# Patient Record
Sex: FEMALE | Race: BLACK OR AFRICAN AMERICAN | Employment: FULL TIME | URBAN - METROPOLITAN AREA
[De-identification: names, ages, dates, MRNs, and addresses within clinical notes are randomized per-mention and may not be internally consistent; named-entity substitution may affect disease eponyms.]

---

## 2017-02-14 ENCOUNTER — APPOINTMENT (OUTPATIENT)
Dept: PHYSICAL THERAPY | Age: 47
End: 2017-02-14

## 2017-02-15 ENCOUNTER — HOSPITAL ENCOUNTER (OUTPATIENT)
Dept: PHYSICAL THERAPY | Age: 47
Discharge: HOME | End: 2017-02-15
Payer: COMMERCIAL

## 2017-02-15 PROCEDURE — 97162 PT EVAL MOD COMPLEX 30 MIN: CPT

## 2017-02-15 PROCEDURE — 97530 THERAPEUTIC ACTIVITIES: CPT

## 2017-02-15 NOTE — PROGRESS NOTES
PHYSICAL THERAPY - DAILY TREATMENT NOTE    Patient Name: Dillan Allison        Date: 2/15/2017  : 1970   YES Patient  Verified  Visit #:   1     Insurance: Payor: BLUE CROSS / Plan: [id number] / Product Type: PPO /      In time: 9:00 Out time: 9:50   Total Treatment Time: 50     Medicare Time Tracking (below)   Total Timed Codes (min):  na 1:1 Treatment Time:  na     TREATMENT AREA =  B knee OA    SUBJECTIVE    Pain Level (on 0 to 10 scale):  refused to rate  / 10   Medication Changes/New allergies or changes in medical history, any new surgeries or procedures? YES    If yes, update Summary List   Subjective Functional Status/Changes:  []  No changes reported     History of Condition: Patient reports B knee pain began ~3 months ago, reports R is worse than left and feels popping. Patient reports pain got worse over time. Patient reports she went to ER a few times for pain, and saw PCP who referred her to Dr. Sergey Gardiner and Dr. Suzy Madrigal. Patient reports she underwent x-rays, which determined arthritis in knees. Patient reports rheumatologist told her she has arthritis everywhere and was told she should quit her job. Patient reports she is hoping to be recommended for weight loss surgery. Patient reports she has never used AD.     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    Pt Goals: \"make it better so I don't have to change my job\"    FOTO: 33         OBJECTIVE  Physical Therapy Evaluation - Knee    Gait:  [] Normal    [x] Abnormal    [x] Antalgic    [] NWB    Device: none    Describe: very slow stu, slumped posture, antalgic on R    ROM / Strength  [] Unable to assess                  AROM                      PROM Strength (1-5)    Left Right Left Right Left Right   Hip Flexion     4+ 4    Extension          Abduction seated     4 4    Adduction  seated     3 3   Knee Flexion 106 85  PD 4+ 4    Extension -6 -5   4+ 4-   Ankle Plantarflexion          Dorsiflexion     4 4   Pain with knee ext MMT L; all on R     Flexibility: [x] Unable to assess at this time  Hamstrings:    (L) Tightness= [] WNL   [] Min   [] Mod   [] Severe    (R) Tightness= [] WNL   [] Min   [] Mod   [] Severe  Quadriceps:    (L) Tightness= [] WNL   [] Min   [] Mod   [] Severe    (R) Tightness= [] WNL   [] Min   [] Mod   [] Severe  Gastroc:      (L) Tightness= [] WNL   [] Min   [] Mod   [] Severe    (R) Tightness= [] WNL   [] Min   [] Mod   [] Severe  Other:    Palpation:   Neg/Pos  Neg/Pos  Neg/Pos   Joint Line L lat  R lat and med Quad tendon  Patellar ligament    Patella  Fibular head  Pes Anserinus    Tibial tubercle  Hamstring tendons  Infrapatellar fat pad      Optional Tests:   Patellar Positioning (Static)   []L []R Normal []L []R Lateral   []L []R Estephanie Mcardle      []L []R Medial   []L []R [place]    Patellar Tracking   []L []R Glide (Lat)   []L []R Tilt (Lat)     []L []R Glide (Med)  []L []R Tilt (Med)      []L []R Tile (Inf)     Patellar Mobility   []L []R Hypermobile [x]L [x]R Hypomobile         Girth Measurements in cm:      4 in above midpatella   2 in above midpatella  at  midpatella  2 in below midpatella   4 in below midpatella   Left        Right           Lachmans  [] Neg    [] Pos Posterior Drawer [] Neg    [] Pos  Pivot Shift  [] Neg    [] Pos Posterior Sag  [] Neg    [] Pos  PORTILLO   [] Neg    [] Pos Dre's Test [] Neg    [] Pos  ALRI   [] Neg    [] Pos Squat   [] Neg    [] Pos  Valgus@ 0 Degrees [] Neg    [] Pos Sumit  [] Neg    [] Pos  Valgus@ 30 Degrees [] Neg    [] Pos Patellar Apprehension [] Neg    [] Pos  Varus@ 0 Degrees [] Neg    [] Pos Magdaleno's Compression [] Neg    [] Pos  Varus@ 30 Degrees [] Neg    [] Pos Ely's Test  [] Neg    [] Pos  Apley's Compression [] Neg    [] Pos Bridget's Test  [] Neg    [] Pos  Apley's Distraction [] Neg    [] Pos Stroke Test  [] Neg    [] Pos   Anterior Drawer [] Neg    [] Pos Fluctuation Test [] Neg    [] Pos  Other:                  [] Neg    [] Pos                 25 min Therapeutic Activity: FOTO, pool paperwork review   Rationale:   Determine functional limitations, ensure safety with aquatherapy   min Patient Education:  YES  Reviewed HEP   []  Progressed/Changed HEP based on:   HEP issued      Other Objective/Functional Measures:    See above     Post Treatment Pain Level (on 0 to 10) scale:   NR  / 10     ASSESSMENT    Assessment/Changes in Function:     Justification for Eval Code Complexity:  Patient History (low 0, mod 1-2, high 3-4): heart disease, arthritis, HTN, asthma, Hx multiple surgeries in R groin, depression, back pain, obese, GI disease, incontinence  Examination (low 1-2, mod 3+, high 4+):   Clinical Presentation (low stable or uncomplicated, mod evolving or changing, high unstable or unpredictable):   Clinical Decision Making (low , mod 26-74, high 1-25): FOTO     See PoC     []  See Progress Note/Recertification   Patient will continue to benefit from skilled PT services to modify and progress therapeutic interventions, address functional mobility deficits, address ROM deficits, address strength deficits, analyze and address soft tissue restrictions, analyze and cue movement patterns, analyze and modify body mechanics/ergonomics and assess and modify postural abnormalities to attain remaining goals.    Progress toward goals / Updated goals:    Goals established, See PoC     PLAN    [x]  Upgrade activities as tolerated YES Continue plan of care   []  Discharge due to :    [x]  Other: Initiate PoC     Therapist: Tahmina Mock PT    Date: 2/15/2017 Time: 9:15 AM

## 2017-02-15 NOTE — PROGRESS NOTES
[facility] PHYSICAL THERAPY  [address] [place] [address] [address], [address] - Phone: [phone]  Fax: [phone] / [facility]  Patient Name: Frankey Stall : 1970   Medical   Diagnosis: Right knee pain [M25.561] Treatment Diagnosis: R>L knee OA   Onset Date: 3 months ago     Referral Source: MD Dr. Riya Peters [facility]): 2/15/2017   Prior Hospitalization: See medical history Provider #: [id number]   Prior Level of Function: Sedentary; works as HRT    Comorbidities: heart disease, arthritis, HTN, asthma, Hx multiple surgeries in R groin, depression, back pain, obese, GI disease, incontinence   Medications: Verified on Patient Summary List   The Plan of Care and following information is based on the information from the initial evaluation.   ===========================================================================================  Assessment / key information:  Frankey Stall is a 48 y.o.  female with Dx: Right knee pain [M25.561], signs and symptoms consistent with B knee OA. Patient reports c/o pain and popping in R>L knees beginning a few months ago. Patient reports she has difficulty performing job duties (driving) due to severity of knee pain. Patient denies use of AD. Patient also perseverates on desire to receive weight loss surgery so she can have knee replacements. Objective: Patient demonstrates impaired gait and posture, significantly decreased R knee AROM and strength, refused PROM due to pain. Patient reports pain with knee ext MMT, all MMT on RLE. Patient also TTP over L lateral joint line, and R medial and lateral joint lines, as well as hypomobility of B patellae. Patient refuses land based therapy, would like to participate in aquatherapy at this time. Patient also refused to report pain levels pre or post session.       AROM   PROM   Strength (1-5)      Left Right Left Right Left Right   Hip Flexion         4+ 4     Abduction seated         4 4     Adduction  seated         3 3   Knee Flexion 106 85   PD 4+ 4     Extension -6 -5     4+ 4-   Ankle Plantarflexion                 Dorsiflexion         4 4     FOTO score 33 points indicating 67% limitation in functional ability. Patient would benefit from skilled PT to address the below listed impairments. Thank you for your referral.  ===========================================================================================  Eval Complexity: History: HIGH Complexity :3+ comorbidities / personal factors will impact the outcome/ POC Exam:HIGH Complexity : 4+ Standardized tests and measures addressing body structure, function, activity limitation and / or participation in recreation  Presentation: MEDIUM Complexity : Evolving with changing characteristics  Clinical Decision Making:MEDIUM Complexity : FOTO score of 26-74Overall Complexity:MEDIUM  Problem List: pain affecting function, decrease ROM, decrease strength, edema affecting function, impaired gait/ balance, decrease ADL/ functional abilitiies, decrease activity tolerance, decrease flexibility/ joint mobility and decrease transfer abilities   Treatment Plan may include any combination of the following: Therapeutic exercise, Therapeutic activities, Neuromuscular re-education, Physical agent/modality, Gait/balance training, Manual therapy, Aquatic therapy, Patient education, Self Care training, Functional mobility training, Home safety training and Stair training  Patient / Family readiness to learn indicated by: asking questions, trying to perform skills and interest  Persons(s) to be included in education: patient (P)  Barriers to Learning/Limitations: None  Measures taken: na   Patient Goal (s): \"make it better so I don't have to change my job\"   Patient self reported health status: fair  Rehabilitation Potential: fair   Short Term Goals:  To be accomplished in 2-3  weeks:  1. Patient will demonstrate compliance with HEP for symptom management at home. 2. Patient will improve R knee flexion AROM to >90 degrees to improve safety with amb. 3. Patient will report pain at worst 7/10 to improve ADL tolerance.  Long Term Goals: To be accomplished in  4-6  weeks:  1. Patient will be independent with HEP to self-manage and prevent symptoms upon DC. 2.  Patient will improve FOTO score to 57 points to indicate improved functional status. 3.  Patient will improve R knee flex and ext strength to 4+/5 to improve stability with amb. 4.  Patient will report ability to drive >1 hour to return to full work duties. Frequency / Duration:   Patient to be seen  2  times per week for 4-6  weeks:  Patient / Caregiver education and instruction: self care and activity modification  G-Codes (GP): lauri  Therapist Signature: Dalila Estrada PT Date: 8/01/3443   Certification Period: na Time: 9:15 AM   ===========================================================================================  I certify that the above Physical Therapy Services are being furnished while the patient is under my care. I agree with the treatment plan and certify that this therapy is necessary. Physician Signature:        Date:       Time:     Please sign and return to [employer] or you may fax the signed copy to [phone]. Thank you.

## 2017-02-23 ENCOUNTER — HOSPITAL ENCOUNTER (OUTPATIENT)
Dept: PHYSICAL THERAPY | Age: 47
Discharge: HOME | End: 2017-02-23
Payer: COMMERCIAL

## 2017-02-23 ENCOUNTER — APPOINTMENT (OUTPATIENT)
Dept: PHYSICAL THERAPY | Age: 47
End: 2017-02-23
Payer: COMMERCIAL

## 2017-02-23 PROCEDURE — 97530 THERAPEUTIC ACTIVITIES: CPT

## 2017-02-23 PROCEDURE — 97162 PT EVAL MOD COMPLEX 30 MIN: CPT

## 2017-02-23 NOTE — PROGRESS NOTES
PHYSICAL THERAPY - DAILY TREATMENT NOTE    Patient Name: Faraz Vital        Date: 2017  : 1970   YES Patient  Verified  Visit #:   2   of     Insurance: Payor: BLUE CROSS / Plan: [id number] / Product Type: PPO /      In time: 11:00 Out time: 11:40   Total Treatment Time: 40     Medicare Time Tracking (below)   Total Timed Codes (min):  na 1:1 Treatment Time:  na     TREATMENT AREA =  B knee pain, LBP  SUBJECTIVE    Pain Level (on 0 to 10 scale):  5  / 10   Medication Changes/New allergies or changes in medical history, any new surgeries or procedures? 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 Patient reports she was off work 2 weeks and has had decreased pain since and has not had to take medication. Patient has also recently been put on antidepressant. Patient denies radicular symptoms, reports interm a little bit of numbness if sitting a long time.     Aggravating Factors: driving sometimes, lifting, twisting back, twinges with certain motions, walking if she is already in pain, stooping    Alleviating Factors: heating pad, lying on back sometimes, fetal position    Previous Treatment: none    PMHx:see chart    Social/Recreational/Work: see previous eval    Pt Goals: \"get where I can lift things that I need to lift and not have to wait for someone to do it for me\"     FOTO: 37     LOW BACK EVALUATION    Objective:      Gait: decreased step length, decreased trunk rotation    Posture:slumped posture, forward head, rounded shoulders    Palpation/Sensation:    (N - normal; R - reduced; MR - markedly reduced)       L/S ROM      Range   Effect  Strength (MMT)          Right        Left    Flex 50%  Psoas (L2,3) 4 4+   Ext 75%  Quads (L3) 4 4+   R-lat flex 75%  Ant tib(L4) 4+ 4+   L-lat flex 75% pain Hip Ext (S1, S2)     R-rot 75%  Glut Med(L5) 4 4-   L-rot 75%  Hamstrings(S1,S2) 4+ 4+      Hip IR/ER       Strength (MMT)       Right     Left          Core: Sup Bridge fair fair   Core: Side Bridge     Core: Prone plank       Special Tests                       Right     Left          Flexibility         Right              Left    Slump   90/90 HS -23 -18   SLR   Ely     Crossed SLR  +  Guilherme     Sl screen 3/5=70% LR   Bridget     Gaenslen test   Hip IR (prone)     Neemsio/IJEOMA sign   Hip ER (prone)     Thigh thrust        Distraction        Lateral Compression        Sacral Provocation          Effect of:  DKC    Supine Bridge    SL Supine Bridge    Prone on Elbows    RFIS    REIL          SI Symmetry:    Standing        Supine            Prone                Dynamic      +/- R/L  ASIS    Fwd Flex    IC    Stork    PSIS    Seated FF      Sup to Sit:     10 min Therapeutic Activity: FOTO   Rationale:   Determine functional limitations     min Patient Education:  []  Review HEP   []  Progressed/Changed HEP based on:   HEP initiated      Other Objective/Functional Measures:    See above information     Post Treatment Pain Level (on 0 to 10) scale:   3  / 10     ASSESSMENT    Assessment/Changes in Function:     Justification for Eval Code Complexity:  Patient History (low 0, mod 1-2, high 3-4): heart disease, arthritis, HTN, asthma, Hx multiple surgeries in R groin, depression, back pain, obese, GI disease, incontinence  Examination (low 1-2, mod 3+, high 4+):   Clinical Presentation (low stable or uncomplicated, mod evolving or changing, high unstable or unpredictable):   Clinical Decision Making (low , mod 26-74, high 1-25): FOTO     See PoC     []  See Progress Note/Recertification   Patient will continue to benefit from skilled PT services to modify and progress therapeutic interventions, address functional mobility deficits, address ROM deficits, address strength deficits, analyze and address soft tissue restrictions, analyze and cue movement patterns, analyze and modify body mechanics/ergonomics and assess and modify postural abnormalities to attain remaining goals.    Progress toward goals / Updated goals:    Goals established, see PoC     PLAN    [x]  Upgrade activities as tolerated YES Continue plan of care   []  Discharge due to :    [x]  Other: Initiate PoC     Therapist: Lynn Richey PT    Date: 2/23/2017 Time: 11:01 AM

## 2017-02-23 NOTE — PROGRESS NOTES
[facility] PHYSICAL THERAPY  [address] [place] [address] [address], [address] - Phone: [phone]  Fax: [phone]  [place]          Patient Name: Esau Bansal : 1970   Treatment/Medical Diagnosis: Low back pain [M54.5]   Onset Date: ~3 months ago    Referral Source: MD Dr. Brett Najera [facility]): 2/15/17 for knees  17 for back   Prior Hospitalization: See Medical History Provider #: [id number]   Prior Level of Function: Sedentary; works as HRT    Comorbidities: heart disease, arthritis, HTN, asthma, Hx multiple surgeries in R groin, depression, back pain, obese, GI disease, incontinence   Medications: Verified on Patient Summary List   Vis[facility] from [facility]: 2 Missed Visits: 0     Please see PoC dated 2/15/17. 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 Patient initially denies radicular symptoms, then reports she interm has LE numbness if sitting for a long time. Patient demonstrates impaired gait and posture, decreased lumbar ROM and LE strength. Patient with fair supine bridge, + crossed SLR on R (pain on left)m and + 90/90 HS test on R. L/S ROM Range Effect Strength (MMT) Right Left   Flex 50%   Psoas (L2,3) 4 4+   Ext 75%   Quads (L3) 4 4+   R-lat flex 75%   Ant tib(L4) 4+ 4+   L-lat flex 75% pain Hip Ext (S1, S2)       R-rot 75%   Glut Med(L5) 4 4-   L-rot 75%   Hamstrings(S1,S2) 4+ 4+     FOTO score for L/S 37 indicating 63% limitation in functional ability.     Problem List: pain affecting function, decrease ROM, decrease strength, edema affecting function, impaired gait/ balance, decrease ADL/ functional abilitiies, decrease activity tolerance, decrease flexibility/ joint mobility and decrease transfer abilities   Treatment Plan may include any combination of the following: Therapeutic exercise, Therapeutic activities, Neuromuscular re-education, Physical agent/modality, Gait/balance training, Manual therapy, Aquatic therapy, Patient education, Self Care training, Functional mobility training, Home safety training and Stair training  Patient Goal(s) has been updated and includes:  \"get where I can lift things that I need to lift and not have to wait for someone to do it for me\"  \"make it better so I don't have to change my job\"   · Short Term Goals: To be accomplished in 2-3 weeks:  1. Patient will demonstrate compliance with HEP for symptom management at home. 2. Patient will improve R knee flexion AROM to >90 degrees to improve safety with amb. 3. Patient will report pain at worst 7/10 to improve ADL tolerance. · Long Term Goals: To be accomplished in 4-6 weeks:  1. Patient will be independent with HEP to self-manage and prevent symptoms upon DC. 2. Patient will improve knee FOTO score to 57 points to indicate improved functional status. 3. Patient will improve R knee flex and ext strength to 4+/5 to improve stability with amb. 4. Patient will report ability to drive >1 hour to return to full work duties. L/S goals to be accomplished in 4-6 weeks  1. Patient will improve L/S FOTO score to 58 points to indicate improved functional status. 2. Patient will demonstrate - 90/90 HS test bilaterally to decrease stress on spine. 3. Patient will improve B hip abd strength to 4/5 to improve dynamic pelvic stability. Frequency / Duration:   Patient to be seen   2   times per week for   4    weeks:  G-Codes (GP): na  Assessments/Recommendations: Patient to continue initial PoC  If you have any questions/comments please contact us directly at [phone]. Thank you for allowing us to assist in the care of your patient.     Therapist Signature: Lynn Richey, PT Date: 1/69/5261   Certification Period:  Reporting Period: na  na Time: 11:01 AM   NOTE TO PHYSICIAN:  PLEASE COMPLETE THE ORDERS BELOW AND FAX TO   [facility] Physical Therapy: [phone]. If you are unable to process this request in 24 hours please contact our office: [phone].    ___ I have read the above report and request that my patient continue as recommended.   ___ I have read the above report and request that my patient continue therapy with the following changes/special instructions: ________________________________________________   ___ I have read the above report and request that my patient be discharged from therapy.      Physician Signature:        Date:       Time:

## 2017-02-24 ENCOUNTER — HOSPITAL ENCOUNTER (OUTPATIENT)
Dept: PHYSICAL THERAPY | Age: 47
Discharge: HOME | End: 2017-02-24
Payer: COMMERCIAL

## 2017-02-24 PROCEDURE — 97113 AQUATIC THERAPY/EXERCISES: CPT

## 2017-02-24 NOTE — PROGRESS NOTES
PHYSICAL THERAPY - DAILY TREATMENT NOTE - AQUATICS    Patient Name: Tin Grant        Date: 2017  : 1970   YES Patient  Verified  Visit #:   3     Insurance: Payor: Wu Almeida / Plan: [id number] [employer] / Product Type: PPO /      In time: 11:00 Out time: 11:46   Total Treatment Time: 46     Medicare Time Tracking (below)   Total Timed Codes (min):  na 1:1 Treatment Time:  na     TREATMENT AREA =  B knee pain, LBP    SUBJECTIVE    Pain Level (on 0 to 10 scale):  0  / 10   Medication Changes/New allergies or changes in medical history, any new surgeries or procedures? NO    If yes, update Summary List   Subjective Functional Status/Changes:  []  No changes reported     Pt reports no pain right now because she took pain medication earlier. OBJECTIVE  46  (23) min Therapeutic Exercise:   [x]  Aquatic Therapy   Rationale:      increase strength, increase ROM, and improve balance to improve the patients ability to perform ADL's and ambulate with less pain and increase activity tolerance.       [x]   Patient Education:  Continue Aquatic Therapy and HEP as instructed     UE exercise resistance:                                                  LE exercises:                                                                 [] none                                                                             []  thera-band resistance       [x] small water weights                                                   [] no UE support       [] medium water weights                                              [x]  1 UE support        [] large water weights                                                   [x]  2 UE support          [] back supported on wall       [] thera-band      SLS UE support:                      [] both UE                          [] 1 UE       [] 1 finger/fingers       []  none       [] EC     Post Treatment Pain Level (on 0 to 10) scale:   2  / 10     Other Introduction to aquatic therapy program.   Pt completed 10-20 reps of each exercise depending on difficulty/tolerance. Pt reports ms fatigue and soreness at end of session. ASSESSMENT    Assessment/Changes in Function:     Pt with good tolerance to initiation of aquatic ex. []  See Progress Note/Recertification   Patient will continue to benefit from skilled PT services to modify and progress therapeutic interventions, address functional mobility deficits, address ROM deficits, address strength deficits, analyze and address soft tissue restrictions, analyze and cue movement patterns, analyze and modify body mechanics/ergonomics and assess and modify postural abnormalities to attain remaining goals. Progress toward goals / Updated goals: · Short Term Goals: To be accomplished in 2-3 weeks:  1. Patient will demonstrate compliance with HEP for symptom management at home. 2. Patient will improve R knee flexion AROM to >90 degrees to improve safety with amb. 3. Patient will report pain at worst 7/10 to improve ADL tolerance. Initiated aquatics 2/24/17 for pain managment  · Long Term Goals: To be accomplished in 4-6 weeks:  1. Patient will be independent with HEP to self-manage and prevent symptoms upon DC. 2. Patient will improve knee FOTO score to 57 points to indicate improved functional status. 3. Patient will improve R knee flex and ext strength to 4+/5 to improve stability with amb.   4. Patient will report ability to drive >1 hour to return to full work duties.        PLAN    []  Upgrade activities as tolerated YES Continue plan of care   []  Discharge due to :    []  Other:      Therapist: Ruben Askew PTA    Date: 2/24/2017 Time: 3:12 PM     Future Appointments  Date Time Provider Mindi Castellanos   3/2/2017 1:00 PM Tylor Cornelius PT [facility]   [facility]   3/9/2017 1:00 PM Tylor Cornelius PT [facility]   3/10/2017 11:00 AM [facility]   3/16/2017 1:00 PM Neo Hopson PT [facility]   3/17/2017 11:00 AM [address] [place] [address]   3/23/2017 1:00 PM Neo Hopson PT [facility]   3/24/2017 11:00 AM [address] [place] [address]   3/30/2017 1:00 PM Neo Hopson PT [facility]   3/31/2017 11:00 AM [address] [place] [address]

## 2017-03-02 ENCOUNTER — HOSPITAL ENCOUNTER (OUTPATIENT)
Dept: PHYSICAL THERAPY | Age: 47
Discharge: HOME | End: 2017-03-02
Payer: COMMERCIAL

## 2017-03-02 PROCEDURE — 97113 AQUATIC THERAPY/EXERCISES: CPT

## 2017-03-02 NOTE — PROGRESS NOTES
PHYSICAL THERAPY - DAILY TREATMENT NOTE - AQUATICS    Patient Name: Trent Booth        Date: 3/2/2017  : 1970   YES Patient  Verified  Visit #:   4     Insurance: Payor: Mckenzie Brewster / Plan: [id number] / Product Type: PPO /      In time: 1:00 Out time: 1:52   Total Treatment Time: 52     Medicare Time Tracking (below)   Total Timed Codes (min):  na 1:1 Treatment Time:  na     TREATMENT AREA =  Right knee pain [M25.561]    SUBJECTIVE    Pain Level (on 0 to 10 scale):  0  / 10   Medication Changes/New allergies or changes in medical history, any new surgeries or procedures? NO    If yes, update Summary List   Subjective Functional Status/Changes:  []  No changes reported     \"I was sore after last time. I got the times mixed up so I have been here for a few hours. \"          OBJECTIVE  52 min Therapeutic Exercise:   [x]  Aquatic Therapy   Rationale:      increase strength, improve coordination, improve balance and increase proprioception to improve the patients ability to perform ADL's and amb with decreased pain and improved ease      [x]   Patient Education:  Continue Aquatic Therapy and HEP as instructed     UE exercise resistance:                                                  LE exercises:                                                                 [] none                                                                             []  thera-band resistance       [] small water weights                                                   [] no UE support       [x] medium water weights                                              [x]  1 UE support        [] large water weights                                                   [x]  2 UE support          [x] back supported on wall       [] thera-band      SLS UE support:                      [] both UE                          [] 1 UE       [] 1 finger/fingers       []  none       [] EC     Post Treatment Pain Level (on 0 to 10) scale:   2-3  / 10     Other  patient required cueing to inform therapist of increased pain, however failed to do so throughout session  Patient reports after performing sidestepping 4/10 pain in L/S and hip  Cues for posturing throughout       ASSESSMENT    Assessment/Changes in Function:     Patient with slight increase in symptoms despite cueing to inform therapist for necessary exercise adjustments     []  See Progress Note/Recertification   Patient will continue to benefit from skilled PT services to modify and progress therapeutic interventions, address functional mobility deficits, address ROM deficits, address strength deficits, analyze and address soft tissue restrictions, analyze and cue movement patterns, analyze and modify body mechanics/ergonomics and assess and modify postural abnormalities to attain remaining goals. Progress toward goals / Updated goals: · Short Term Goals: To be accomplished in 2-3 weeks:  1. Patient will demonstrate compliance with HEP for symptom management at home. 2. Patient will improve R knee flexion AROM to >90 degrees to improve safety with amb. 3. Patient will report pain at worst 7/10 to improve ADL tolerance. Initiated aquatics 2/24/17 for pain managment  · Long Term Goals: To be accomplished in 4-6 weeks:  1. Patient will be independent with HEP to self-manage and prevent symptoms upon DC. 2. Patient will improve knee FOTO score to 57 points to indicate improved functional status. 3. Patient will improve R knee flex and ext strength to 4+/5 to improve stability with amb. 4. Patient will report ability to drive >1 hour to return to full work duties.      PLAN    [x]  Upgrade activities as tolerated YES Continue plan of care   []  Discharge due to :    []  Other:      Therapist: Sailaja Rogel PT    Date: 3/2/2017 Time: 12:55 PM     Future Appointments  Date Time Provider Mindi Castellanos   3/2/2017 1:00 PM Sailaja Rogel, [address] [place] [address]   3/3/2017 11:00  [address] [facility]   3/9/2017 1:00 PM Hilario Robert PT [facility] AT [place] [address]   3/10/2017 11:00  [address] [facility] [address]   3/16/2017 1:00 PM Hilario Robert PT [facility] AT [address]   3/17/2017 11:00  [address] [facility] [address]   3/23/2017 1:00 PM Hilario Robert PT [facility] AT [address]   3/24/2017 11:00  [address] [facility] [address]   3/30/2017 1:00 PM Hilario Robert PT [address]   3/31/2017 11:00  [address] [facility] [address]

## 2017-03-03 ENCOUNTER — HOSPITAL ENCOUNTER (OUTPATIENT)
Dept: PHYSICAL THERAPY | Age: 47
Discharge: HOME | End: 2017-03-03
Payer: COMMERCIAL

## 2017-03-03 PROCEDURE — 97113 AQUATIC THERAPY/EXERCISES: CPT

## 2017-03-03 NOTE — PROGRESS NOTES
PHYSICAL THERAPY - DAILY TREATMENT NOTE - AQUATICS    Patient Name: Tin Grant        Date: 3/3/2017  : 1970   YES Patient  Verified  Visit #:   5     Insurance: Payor: Wu Almeida / Plan: [id number] [employer] / Product Type: PPO /      In time: 11:00 Out time: 11:47   Total Treatment Time: 47     Medicare Time Tracking (below)   Total Timed Codes (min):  na 1:1 Treatment Time:  na     TREATMENT AREA =  Right knee pain [M25.561]     SUBJECTIVE    Pain Level (on 0 to 10 scale):  0  / 10   Medication Changes/New allergies or changes in medical history, any new surgeries or procedures? NO    If yes, update Summary List   Subjective Functional Status/Changes:  []  No changes reported     Pt states that she just took a pain pill, so isn't having any pain now. OBJECTIVE  47 min Therapeutic Exercise:   [x]  Aquatic Therapy   Rationale:      increase strength, increase ROM, and improve balance to improve the patients ability to perform ADL's and ambulate with less pain and increase activity tolerance.       [x]   Patient Education:  Continue Aquatic Therapy and HEP as instructed     UE exercise resistance:                                                  LE exercises:                                                                 [] none                                                                             []  thera-band resistance       [] small water weights                                                   [] no UE support       [x] medium water weights                                              [x]  1 UE support        [] large water weights                                                   [x]  2 UE support          [] back supported on wall       [] thera-band      SLS UE support:                      [] both UE                          [] 1 UE       [] 1 finger/fingers       []  none       [] EC     Post Treatment Pain Level (on 0 to 10) scale:   2  / 10     Other Pt with c/o L hip pain with rotational component of soccer kicks, therapist instructed pt to perform ex in decreased AROM  and pt with increased tolerance. Mod VCing for posture with UE ex and form with aquatic ex. Pt with c/o ms soreness across L/S at end of session. ASSESSMENT    Assessment/Changes in Function:     Pt with good tolerance to aquatic ex; however, pt with c/o ms soreness across lower L/S after getting out of pool. []  See Progress Note/Recertification   Patient will continue to benefit from skilled PT services to modify and progress therapeutic interventions, address functional mobility deficits, address ROM deficits, address strength deficits, analyze and address soft tissue restrictions, analyze and cue movement patterns, analyze and modify body mechanics/ergonomics and assess and modify postural abnormalities to attain remaining goals. Progress toward goals / Updated goals:  Short Term Goals: To be accomplished in 2-3 weeks:  1. Patient will demonstrate compliance with HEP for symptom management at home. 2. Patient will improve R knee flexion AROM to >90 degrees to improve safety with amb. 3. Patient will report pain at worst 7/10 to improve ADL tolerance. Initiated aquatics 2/24/17 for pain managment  · Long Term Goals: To be accomplished in 4-6 weeks:  1. Patient will be independent with HEP to self-manage and prevent symptoms upon DC. Pt requires mod VCing for posture and form with aquatic ex. 2. Patient will improve knee FOTO score to 57 points to indicate improved functional status. 3. Patient will improve R knee flex and ext strength to 4+/5 to improve stability with amb. 4. Patient will report ability to drive >1 hour to return to full work duties.      PLAN    []  Upgrade activities as tolerated YES Continue plan of care   []  Discharge due to :    []  Other:      Therapist: Radha Blancas PTA    Date: 3/3/2017 Time: 3:28 PM     Future Appointments  Date Time Provider Mindi Castellanos   3/9/2017 1:00 PM Sandra Costa, PT [facility]   3/10/2017 11:00 AM [address] [place] [address]   3/16/2017 1:00 PM Sandra Costa, PT [facility]   3/17/2017 11:00 AM [address] [place] [address]   3/23/2017 1:00 PM Sandra Costa PT [facility]   3/24/2017 11:00 AM [address] [place] [address]   3/30/2017 1:00 PM Sandra Costa PT [facility]   3/31/2017 11:00 AM [address] [place] [address]

## 2017-03-09 ENCOUNTER — HOSPITAL ENCOUNTER (OUTPATIENT)
Dept: PHYSICAL THERAPY | Age: 47
Discharge: HOME | End: 2017-03-09
Payer: COMMERCIAL

## 2017-03-09 PROCEDURE — 97113 AQUATIC THERAPY/EXERCISES: CPT

## 2017-03-09 NOTE — PROGRESS NOTES
PHYSICAL THERAPY - DAILY TREATMENT NOTE - AQUATICS    Patient Name: José Benoit        Date: 3/9/2017  : 1970   YES Patient  Verified  Visit #:     Insurance: Payor: Travis May / Plan: [id number] / Product Type: PPO /      In time: 1:00 Out time: 1:47   Total Treatment Time: 47     Medicare Time Tracking (below)   Total Timed Codes (min):  na 1:1 Treatment Time:  na     TREATMENT AREA =  Right knee pain [M25.561]    SUBJECTIVE    Pain Level (on 0 to 10 scale):  0  / 10   Medication Changes/New allergies or changes in medical history, any new surgeries or procedures? NO    If yes, update Summary List   Subjective Functional Status/Changes:  []  No changes reported     \"I had to go home from work yesterday because I was so sore. \"          OBJECTIVE  45 min Therapeutic Exercise:   [x]  Aquatic Therapy   Rationale:      increase strength, improve coordination, improve balance and increase proprioception to improve the patients ability to perform ADL's and amb with decreased pain and improved ease      [x]   Patient Education:  Continue Aquatic Therapy and HEP as instructed     UE exercise resistance:                                                  LE exercises:                                                                 [] none                                                                             []  thera-band resistance       [] small water weights                                                   [] no UE support       [x] medium water weights                                              [x]  1 UE support        [] large water weights                                                   [x]  2 UE support          [] back supported on wall       [] thera-band      SLS UE support:                      [] both UE                          [] 1 UE       [] 1 finger/fingers       []  none       [] EC     Post Treatment Pain Level (on 0 to 10) scale:   5  / 10 Other  c/o R hip pain after session after exiting pool  Very slow stu with amb activities  Mod cueing for form with ex's, demonstrated decreased recollection of ex's from previous visits       ASSESSMENT    Assessment/Changes in Function:     Patient with increased pain after session and limited carryover of knowledge of ex's     []  See Progress Note/Recertification   Patient will continue to benefit from skilled PT services to modify and progress therapeutic interventions, address functional mobility deficits, address ROM deficits, address strength deficits, analyze and address soft tissue restrictions, analyze and cue movement patterns, analyze and modify body mechanics/ergonomics and assess and modify postural abnormalities to attain remaining goals. Progress toward goals / Updated goals:    Short Term Goals: To be accomplished in 2-3 weeks:  1. Patient will demonstrate compliance with HEP for symptom management at home. 2. Patient will improve R knee flexion AROM to >90 degrees to improve safety with amb. 3. Patient will report pain at worst 7/10 to improve ADL tolerance. Initiated aquatics 2/24/17 for pain managment  · Long Term Goals: To be accomplished in 4-6 weeks:  1. Patient will be independent with HEP to self-manage and prevent symptoms upon DC. Pt requires mod VCing for posture and form with aquatic ex. 2. Patient will improve knee FOTO score to 57 points to indicate improved functional status. 3. Patient will improve R knee flex and ext strength to 4+/5 to improve stability with amb. 4. Patient will report ability to drive >1 hour to return to full work duties.      PLAN    [x]  Upgrade activities as tolerated YES Continue plan of care   []  Discharge due to :    []  Other:      Therapist: Brittney Prajapati PT    Date: 3/9/2017 Time: 2:39 PM     Future Appointments  Date Time Provider Mindi Castellanos   3/10/2017 11:00 AM [address] [place] [address]   3/16/2017 1:00 PM Brittney Prajapati, [facility]   3/17/2017 11:00 AM [address] [place] [address]   3/23/2017 1:00 PM Tahmina Mock PT [facility]   3/24/2017 11:00 AM [address] [place] [address]   3/30/2017 1:00 PM Tahmina Mock PT [facility]   3/31/2017 11:00 AM [address] [place] [address]

## 2017-03-10 ENCOUNTER — APPOINTMENT (OUTPATIENT)
Dept: PHYSICAL THERAPY | Age: 47
End: 2017-03-10
Payer: COMMERCIAL

## 2017-03-17 ENCOUNTER — HOSPITAL ENCOUNTER (OUTPATIENT)
Dept: PHYSICAL THERAPY | Age: 47
Discharge: HOME | End: 2017-03-17
Payer: COMMERCIAL

## 2017-03-17 PROCEDURE — 97113 AQUATIC THERAPY/EXERCISES: CPT

## 2017-03-17 NOTE — PROGRESS NOTES
PHYSICAL THERAPY - DAILY TREATMENT NOTE - AQUATICS    Patient Name: Shannan Denise        Date: 3/17/2017  : 1970   YES Patient  Verified  Visit #:     Insurance: Payor: Buffy Parker / Plan: [id number] / Product Type: PPO /      In time: 11:00 Out time: 11:46   Total Treatment Time: 46     Medicare Time Tracking (below)   Total Timed Codes (min):  na 1:1 Treatment Time:  na     TREATMENT AREA =  Right knee pain [M25.561]    SUBJECTIVE    Pain Level (on 0 to 10 scale):  5  / 10   Medication Changes/New allergies or changes in medical history, any new surgeries or procedures? NO    If yes, update Summary List   Subjective Functional Status/Changes:  []  No changes reported     Pt reports that she thought she was coming down with the flu so she stayed home to rest.           OBJECTIVE  46 min Therapeutic Exercise:   [x]  Aquatic Therapy   Rationale:      increase strength, increase ROM, and improve balance to improve the patients ability to perform ADL's and ambulate with less pain and increase activity tolerance.       [x]   Patient Education:  Continue Aquatic Therapy and HEP as instructed     UE exercise resistance:                                                  LE exercises:                                                                 [] none                                                                             []  thera-band resistance       [] small water weights                                                   [] no UE support       [] medium water weights                                              [x]  1 UE support        [] large water weights                                                   [x]  2 UE support          [] back supported on wall       [] thera-band      SLS UE support:                      [] both UE                          [] 1 UE       [] 1 finger/fingers       []  none       [] EC     Post Treatment Pain Level (on 0 to 10) scale: 5  / 10     Other Min VCing for posture with UE ex. Pt demo's slow amb speed in water. ASSESSMENT    Assessment/Changes in Function:     Pt with good tolerance to aquatic ex, but no change in pain level reported. []  See Progress Note/Recertification   Patient will continue to benefit from skilled PT services to modify and progress therapeutic interventions, address functional mobility deficits, address ROM deficits, address strength deficits, analyze and address soft tissue restrictions, analyze and cue movement patterns, analyze and modify body mechanics/ergonomics and assess and modify postural abnormalities to attain remaining goals. Progress toward goals / Updated goals:    1. Patient will demonstrate compliance with HEP for symptom management at home. 2. Patient will improve R knee flexion AROM to >90 degrees to improve safety with amb. 3. Patient will report pain at worst 7/10 to improve ADL tolerance. Initiated aquatics 2/24/17 for pain managment  · Long Term Goals: To be accomplished in 4-6 weeks:  1. Patient will be independent with HEP to self-manage and prevent symptoms upon DC. Pt requires min VCing for posture and form with aquatic ex. 2. Patient will improve knee FOTO score to 57 points to indicate improved functional status. 3. Patient will improve R knee flex and ext strength to 4+/5 to improve stability with amb.   4. Patient will report ability to drive >1 hour to return to full work duties     PLAN    []  Upgrade activities as tolerated YES Continue plan of care   []  Discharge due to :    []  Other:      Therapist: Lily Wall PTA    Date: 3/17/2017 Time: 3:12 PM     Future Appointments  Date Time Provider Mindi Castellanos   3/23/2017 1:00 PM Justin Thornton Arm [address]   3/24/2017 11:00 AM [address] [place] [address] Drive   3/30/2017 1:00 PM Norberto Cole PT [place] [address]   3/31/2017 11:00 AM [address] [place] [address] Drive

## 2017-03-23 ENCOUNTER — HOSPITAL ENCOUNTER (OUTPATIENT)
Dept: PHYSICAL THERAPY | Age: 47
Discharge: HOME | End: 2017-03-23
Payer: COMMERCIAL

## 2017-03-23 PROCEDURE — 97113 AQUATIC THERAPY/EXERCISES: CPT

## 2017-03-23 NOTE — PROGRESS NOTES
PHYSICAL THERAPY - DAILY TREATMENT NOTE - AQUATICS    Patient Name: Alhaji Yan        Date: 3/23/2017  : 1970   YES Patient  Verified  Visit #:     Insurance: Payor: Daniella Lynch / Plan: [id number] / Product Type: PPO /      In time: 1:00 Out time: 1:49   Total Treatment Time: 49     Medicare Time Tracking (below)   Total Timed Codes (min):  na 1:1 Treatment Time:  na     TREATMENT AREA =  Right knee pain [M25.561]    SUBJECTIVE    Pain Level (on 0 to 10 scale):  0  / 10   Medication Changes/New allergies or changes in medical history, any new surgeries or procedures?     NO    If yes, update Summary List   Subjective Functional Status/Changes:  []  No changes reported     \"My knee feels a lot better with light activities, but still hurts with steps and lifting heavy objects\"          OBJECTIVE  49 min Therapeutic Exercise:   [x]  Aquatic Therapy   Rationale:      increase ROM and increase strength to improve the patients ability to  perform ADLs comfortably and ambulate safely         [x]   Patient Education:  Continue Aquatic Therapy and HEP as instructed     UE exercise resistance:                                               LE exercises:                                                                 [] none                                                                             []  thera-band resistance       [] small water weights                                                   [] no UE support       [x] medium water weights                                              [x]  1 UE support        [] large water weights                                                   [x]  2 UE support          [x] back supported on wall       [] thera-band      SLS UE support:                      [] both UE                          [] 1 UE       [] 1 finger/fingers       []  none       [] EC     Post Treatment Pain Level (on 0 to 10) scale:   3-4  / 10     Other  see PN ASSESSMENT    Assessment/Changes in Function:     See PN     []  See Progress Note/Recertification   Patient will continue to benefit from skilled PT services to modify and progress therapeutic interventions, address functional mobility deficits, address ROM deficits, address strength deficits, analyze and address soft tissue restrictions, analyze and cue movement patterns, analyze and modify body mechanics/ergonomics, assess and modify postural abnormalities, address imbalance/dizziness and instruct in home and community integration to attain remaining goals.    Progress toward goals / Updated goals:    See PN     PLAN    [x]  Upgrade activities as tolerated YES Continue plan of care   []  Discharge due to :    []  Other:      Therapist: Keira Galvan    Date: 3/23/2017 Time: 1:13 PM     Future Appointments  Date Time Provider Mindi Castellanos   3/24/2017 11:00 AM [address] [place] [address]   3/30/2017 1:00 PM Wendy Major, [facility] [place] AT    3/31/2017 11:00 AM [address] [place] [address]

## 2017-03-23 NOTE — PROGRESS NOTES
[facility] PHYSICAL THERAPY  [address] [place] [address] Jimmie Zhang, [address] - Phone: [phone]  Fax: [phone] OF CARE/RECERTIFICATION FOR PHYSICAL THERAPY          Patient Name: Niecy Mcguire : 1970   Treatment/Medical Diagnosis: Right knee pain [M25.561]   Onset Date: ~ 4 months ago    Referral Source: Luna Gross MD [facility]): 2/15/17 for knees  17 for back   Prior Hospitalization: See Medical History Provider #: [id number]   Prior Level of Function: Sedentary; works as HRT    Comorbidities: heart disease, arthritis, HTN, asthma, Hx multiple surgeries in R groin, depression, back pain, obese, GI disease, incontinence   Medications: Verified on Patient Summary List   Visits from [facility]: 8 Missed Visits: 3     Goal/Measure of Progress Goal Met? 1. Patient will improve R knee flexion AROM to >90 degrees to improve safety with amb. Status at last Eval: R knee flx : 85 deg Current Status: 94 p! yes   2. Patient will report pain at worst 7/10 to improve ADL tolerance. Status at last Eval: 10/10 Current Status: 4/10 with light activities, 1/10 with heavy activities progressing   3. Patient will improve R knee flex and ext strength to 4+/5 to improve stability with amb.    Status at last Eval: Flexion:4 extension 4- Current Status: Both 3+ with pain Progressing and will address with land therex   4. Patient will report ability to drive >1 hour to return to full work duties. Status at last Eval: unable Current Status: >1 hour of driving yes     Therapy has consisted of dynamic warm-up,B UE and B LE strengthening, stretching, and AROM therex, and cool down laps with verbal cues to ensure proper form. Key Functional Changes: Patient is progressing with goals as demonstrated by decreased pain of 4/10 with light to moderate activities and is now able to tolerate driving for more than 1 hour. Patient still presents with gross LE strength deficits and knee AROM . Problem List: pain affecting function, decrease ROM, decrease strength, decrease ADL/ functional abilitiies, decrease activity tolerance and decrease flexibility/ joint mobility   Treatment Plan may include any combination of the following: Therapeutic exercise, Therapeutic activities, Neuromuscular re-education, Physical agent/modality, Gait/balance training, Manual therapy, Aquatic therapy, Patient education, Self Care training, Functional mobility training, Home safety training and Stair training  Patient Goal(s) has been updated and includes:      Goals for this certification period include and are to be achieved in   4-6  weeks:  1. Patient will be independent with HEP to self-manage and prevent symptoms upon DC. 2.   Patient will improve knee FOTO score to 57 points to indicate improved functional status. 3.   Patient will improve R hip flex, abd, and ext strength to 4+/5 to improve stability with amb. 4.   Patient will report pain at worst 7/10 to improve ADL tolerance. 5    Patient will demonstrate B knee AROM flx of 105 deg in order to increase comfort with step negotiation. Frequency / Duration:   Patient to be seen   2-3   times per week for   4-6    weeks:  G-Codes (GP): lauri  Assessments/Recommendations: Discussed with patient doing one land and one pool session per week to increase patient's gross LE strength and maximize gains. Patient would continue to benefit from skilled PT to address the aforementioned deficits. If you have any questions/comments please contact us directly at [phone]. Thank you for allowing us to assist in the care of your patient. Therapist Signature: Luisa Mclain, VIRGIL Mock, JALEN Date: 3/28/0911   Certification Period:  Reporting Period: na  na Time: 4:59 PM   NOTE TO PHYSICIAN:  PLEASE COMPLETE THE ORDERS BELOW AND FAX TO   [facility]: [phone].   If you are unable to process this request in 24 hours please contact our office: [phone].    ___ I have read the above report and request that my patient continue as recommended.   ___ I have read the above report and request that my patient continue therapy with the following changes/special instructions: ________________________________________________   ___ I have read the above report and request that my patient be discharged from therapy.      Physician Signature:        Date:       Time:

## 2017-03-24 ENCOUNTER — APPOINTMENT (OUTPATIENT)
Dept: PHYSICAL THERAPY | Age: 47
End: 2017-03-24
Payer: COMMERCIAL

## 2017-03-31 ENCOUNTER — APPOINTMENT (OUTPATIENT)
Dept: PHYSICAL THERAPY | Age: 47
End: 2017-03-31
Payer: COMMERCIAL

## 2017-04-06 NOTE — PROGRESS NOTES
[facility] PHYSICAL THERAPY  [address] [place] [address] Drive [address] - Phone: [phone]  Fax: [phone]                             DISCHARGE SUMMARY FOR:        [x]  PHYSICAL THERAPY       []  OCCUPATIONAL THERAPY       Dear / RACHEL/ PA: Radhika Shabazz MD, under your direction, we have been providing physical therapy for your patient Francy Blanco, for a diagnosis of Right knee pain [M25.561]. The patient was scheduled for 14 visits. They attended 8 of them and was last seen on 3/23/17. On the last visit they reported no pain prior to session, however some increased pain after session. Please see PN dated 3/23/17. At that time and previously had discussed with patient importance of compliance with therapy, and that she would be DC if she missed another appt. Patient then cancelled next appt, and NS appt after, arriving >15 minutes after scheduled appt time. Due to the inability to further their care from non-attendance, we are discharging the patient from physical therapy at this time. We appreciate the kind referral and would willingly work with this patient again, should they be able to arrange regular attendance. Your patient's health is our primary concern. Should you have any further questions or concerns, please feel free to contact me at your convenience. Sincerely,     King Crigler, PT                   4/6/2017    If you have any questions/comments please contact us directly at [phone]. Thank you for allowing us to assist in the care of your patient.     Therapist Signature: King Crigler, NAYAN Date: 0/3/3350   Certification Period: na Time: 5:42 PM   NOTE TO PHYSICIAN:  PLEASE COMPLETE THE ORDERS BELOW AND FAX TO   [facility]: [phone]  If you are unable to process this request in 24 hours please contact our office: [phone]    ___ I have read the above report and request that my patient continue as recommended.   ___ I have read the above report and request that my patient continue therapy with the following changes/special instructions:_________________________________________________________   ___ I have read the above report and request that my patient be discharged from therapy.      Physician Signature:        Date:       Time:

## 2017-06-23 ENCOUNTER — HOSPITAL ENCOUNTER (OUTPATIENT)
Dept: LAB | Age: 47
Discharge: HOME | End: 2017-06-23
Payer: COMMERCIAL

## 2017-06-23 PROCEDURE — 88175 CYTOPATH C/V AUTO FLUID REDO: CPT | Performed by: OBSTETRICS & GYNECOLOGY

## 2017-06-23 PROCEDURE — 87624 HPV HI-RISK TYP POOLED RSLT: CPT | Performed by: OBSTETRICS & GYNECOLOGY

## 2017-07-25 ENCOUNTER — HOSPITAL ENCOUNTER (EMERGENCY)
Age: 47
Discharge: HOME | End: 2017-07-25
Attending: EMERGENCY MEDICINE
Payer: SELF-PAY

## 2017-07-25 VITALS
TEMPERATURE: 97.4 F | HEART RATE: 85 BPM | RESPIRATION RATE: 18 BRPM | SYSTOLIC BLOOD PRESSURE: 151 MMHG | DIASTOLIC BLOOD PRESSURE: 95 MMHG | OXYGEN SATURATION: 99 %
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
## 2017-07-25 DIAGNOSIS — T14.8XXA ABRASION: ICD-10-CM

## 2017-07-25 PROCEDURE — 99283 EMERGENCY DEPT VISIT LOW MDM: CPT
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
## 2017-07-25 NOTE — DISCHARGE INSTRUCTIONS

## 2017-07-25 NOTE — LETTER
NOTIFICATION RETURN TO WORK / SCHOOL 
 
7/25/2017 10:51 AM 
 
Ms. Andre Clayton [address] [phone] To Whom It May Concern: 
 
Andre Pendleton is currently under the care of [facility]. She will return to work/school on: 7/27/17. If there are questions or concerns please have the patient contact our office. Sincerely, Marya Lira MD
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
## 2021-08-09 ENCOUNTER — APPOINTMENT (OUTPATIENT)
Dept: GENERAL RADIOLOGY | Age: 51
End: 2021-08-09
Attending: EMERGENCY MEDICINE

## 2021-08-09 ENCOUNTER — HOSPITAL ENCOUNTER (EMERGENCY)
Age: 51
Discharge: LEFT BEFORE DISCHARGE | End: 2021-08-09

## 2021-08-09 ENCOUNTER — OFFICE VISIT (OUTPATIENT)
Dept: URGENT CARE | Age: 51
End: 2021-08-09

## 2021-08-09 ENCOUNTER — TELEPHONE (OUTPATIENT)
Dept: SCHEDULING | Age: 51
End: 2021-08-09

## 2021-08-09 VITALS
HEART RATE: 80 BPM | WEIGHT: 293 LBS | HEIGHT: 65 IN | RESPIRATION RATE: 16 BRPM | DIASTOLIC BLOOD PRESSURE: 108 MMHG | SYSTOLIC BLOOD PRESSURE: 180 MMHG | OXYGEN SATURATION: 98 % | TEMPERATURE: 97.3 F | BODY MASS INDEX: 48.82 KG/M2

## 2021-08-09 VITALS
HEIGHT: 65 IN | BODY MASS INDEX: 48.82 KG/M2 | RESPIRATION RATE: 20 BRPM | SYSTOLIC BLOOD PRESSURE: 160 MMHG | TEMPERATURE: 96.9 F | OXYGEN SATURATION: 96 % | DIASTOLIC BLOOD PRESSURE: 80 MMHG | WEIGHT: 293 LBS | HEART RATE: 88 BPM

## 2021-08-09 DIAGNOSIS — R68.89 FLU-LIKE SYMPTOMS: ICD-10-CM

## 2021-08-09 DIAGNOSIS — Z01.30 BLOOD PRESSURE CHECK: ICD-10-CM

## 2021-08-09 DIAGNOSIS — R53.81 MALAISE AND FATIGUE: ICD-10-CM

## 2021-08-09 DIAGNOSIS — R53.83 MALAISE AND FATIGUE: ICD-10-CM

## 2021-08-09 DIAGNOSIS — Z87.09 HISTORY OF ASTHMA: ICD-10-CM

## 2021-08-09 DIAGNOSIS — R81 GLUCOSE FOUND IN URINE ON EXAMINATION: ICD-10-CM

## 2021-08-09 DIAGNOSIS — Z86.39 HISTORY OF DIABETES MELLITUS: ICD-10-CM

## 2021-08-09 DIAGNOSIS — R82.90 URINE ABNORMALITY: ICD-10-CM

## 2021-08-09 DIAGNOSIS — Z86.79 HISTORY OF HYPERTENSION: ICD-10-CM

## 2021-08-09 DIAGNOSIS — R10.30 LOWER ABDOMINAL PAIN: ICD-10-CM

## 2021-08-09 DIAGNOSIS — R06.02 SOB (SHORTNESS OF BREATH): Primary | ICD-10-CM

## 2021-08-09 LAB
APPEARANCE, POC: CLEAR
ATRIAL RATE (BPM): 79
BILIRUBIN, POC: NEGATIVE
COLOR, POC: YELLOW
EKG IMPRESSION: NORMAL
FASTING STATUS PATIENT QL REPORTED: NO
FLUAV AG UPPER RESP QL IA.RAPID: NEGATIVE
FLUBV AG UPPER RESP QL IA.RAPID: NEGATIVE
GLUCOSE SERPL-MCNC: 94 MG/DL (ref 65–99)
GLUCOSE UR-MCNC: ABNORMAL MG/DL
HGB BLD CALC-MCNC: 12.9 G/DL
INTERNAL PROCEDURAL CONTROLS ACCEPTABLE: YES
KETONES, POC: ABNORMAL MG/DL
NITRITE, POC: POSITIVE
OCCULT BLOOD, POC: NEGATIVE
P AXIS (DEGREES): 52
PH UR: 5.5 [PH] (ref 5–7)
PR-INTERVAL (MSEC): 136
PROT UR-MCNC: NEGATIVE MG/DL
QRS-INTERVAL (MSEC): 88
QT-INTERVAL (MSEC): 388
QTC: 445
R AXIS (DEGREES): 19
REPORT TEXT: NORMAL
S PYO AG THROAT QL IA.RAPID: NEGATIVE
SARS-COV+SARS-COV-2 AG RESP QL IA.RAPID: NOT DETECTED
SP GR UR: 1.01 (ref 1–1.03)
T AXIS (DEGREES): 17
UROBILINOGEN UR-MCNC: 0.2 MG/DL (ref 0–1)
VENTRICULAR RATE EKG/MIN (BPM): 79
WBC (LEUKOCYTE) ESTERASE, POC: NEGATIVE

## 2021-08-09 PROCEDURE — 81002 URINALYSIS NONAUTO W/O SCOPE: CPT | Performed by: PEDIATRICS

## 2021-08-09 PROCEDURE — 10003627 HB COUNTER ED NO SERVICE

## 2021-08-09 PROCEDURE — 87804 INFLUENZA ASSAY W/OPTIC: CPT | Performed by: PEDIATRICS

## 2021-08-09 PROCEDURE — 87880 STREP A ASSAY W/OPTIC: CPT | Performed by: PEDIATRICS

## 2021-08-09 PROCEDURE — 87426 SARSCOV CORONAVIRUS AG IA: CPT | Performed by: PEDIATRICS

## 2021-08-09 PROCEDURE — 71046 X-RAY EXAM CHEST 2 VIEWS: CPT

## 2021-08-09 PROCEDURE — 93000 ELECTROCARDIOGRAM COMPLETE: CPT | Performed by: PEDIATRICS

## 2021-08-09 PROCEDURE — 99215 OFFICE O/P EST HI 40 MIN: CPT | Performed by: PEDIATRICS

## 2021-08-09 PROCEDURE — 85018 HEMOGLOBIN: CPT | Performed by: PEDIATRICS

## 2021-08-09 PROCEDURE — 93005 ELECTROCARDIOGRAM TRACING: CPT | Performed by: EMERGENCY MEDICINE

## 2021-08-09 PROCEDURE — 87086 URINE CULTURE/COLONY COUNT: CPT | Performed by: PEDIATRICS

## 2021-08-09 PROCEDURE — 82962 GLUCOSE BLOOD TEST: CPT | Performed by: PEDIATRICS

## 2021-08-09 RX ORDER — CARVEDILOL 25 MG/1
25 TABLET ORAL 2 TIMES DAILY WITH MEALS
COMMUNITY

## 2021-08-09 RX ORDER — CEPHALEXIN 500 MG/1
500 CAPSULE ORAL 3 TIMES DAILY
Qty: 21 CAPSULE | Refills: 0 | Status: SHIPPED | OUTPATIENT
Start: 2021-08-09 | End: 2021-08-16

## 2021-08-09 RX ORDER — MELOXICAM 15 MG/1
15 TABLET ORAL DAILY
COMMUNITY

## 2021-08-09 RX ORDER — LISINOPRIL 40 MG/1
40 TABLET ORAL DAILY
COMMUNITY

## 2021-08-09 RX ORDER — ALBUTEROL SULFATE 90 UG/1
2 AEROSOL, METERED RESPIRATORY (INHALATION) EVERY 4 HOURS PRN
COMMUNITY

## 2021-08-09 RX ORDER — SPIRONOLACTONE 25 MG/1
25 TABLET ORAL DAILY
COMMUNITY

## 2021-08-09 RX ORDER — HUMAN INSULIN 100 [IU]/ML
INJECTION, SOLUTION SUBCUTANEOUS
COMMUNITY

## 2021-08-09 RX ORDER — ATORVASTATIN CALCIUM 20 MG/1
20 TABLET, FILM COATED ORAL DAILY
COMMUNITY

## 2021-08-09 RX ORDER — FUROSEMIDE 40 MG/1
40 TABLET ORAL DAILY
COMMUNITY

## 2021-08-09 RX ORDER — AMLODIPINE BESYLATE 10 MG/1
10 TABLET ORAL DAILY
COMMUNITY

## 2021-08-09 ASSESSMENT — ENCOUNTER SYMPTOMS
PSYCHIATRIC NEGATIVE: 1
SEIZURES: 0
ENDOCRINE NEGATIVE: 1
HEMATOLOGIC/LYMPHATIC NEGATIVE: 1
COUGH: 1
HEADACHES: 1
RHINORRHEA: 1
ABDOMINAL PAIN: 1
SHORTNESS OF BREATH: 1
CHILLS: 0
ACTIVITY CHANGE: 1
ALLERGIC/IMMUNOLOGIC NEGATIVE: 1
FATIGUE: 0
EYES NEGATIVE: 1
APPETITE CHANGE: 1
TREMORS: 0
FEVER: 0

## 2021-08-11 LAB — BACTERIA UR CULT: NORMAL
